# Patient Record
Sex: MALE | Race: WHITE | NOT HISPANIC OR LATINO | Employment: FULL TIME | URBAN - METROPOLITAN AREA
[De-identification: names, ages, dates, MRNs, and addresses within clinical notes are randomized per-mention and may not be internally consistent; named-entity substitution may affect disease eponyms.]

---

## 2023-10-27 ENCOUNTER — TELEPHONE (OUTPATIENT)
Dept: PRIMARY CARE | Facility: CLINIC | Age: 28
End: 2023-10-27
Payer: COMMERCIAL

## 2023-10-27 DIAGNOSIS — F90.9 ATTENTION DEFICIT HYPERACTIVITY DISORDER (ADHD), UNSPECIFIED ADHD TYPE: ICD-10-CM

## 2023-10-30 RX ORDER — LISDEXAMFETAMINE DIMESYLATE 30 MG/1
30 CAPSULE ORAL
COMMUNITY
End: 2023-10-30 | Stop reason: SDUPTHER

## 2023-10-30 RX ORDER — LISDEXAMFETAMINE DIMESYLATE 30 MG/1
30 CAPSULE ORAL
Qty: 30 CAPSULE | Refills: 0 | Status: SHIPPED | OUTPATIENT
Start: 2023-10-30 | End: 2023-11-01 | Stop reason: SDUPTHER

## 2023-10-31 DIAGNOSIS — F90.9 ATTENTION DEFICIT HYPERACTIVITY DISORDER (ADHD), UNSPECIFIED ADHD TYPE: ICD-10-CM

## 2023-10-31 NOTE — TELEPHONE ENCOUNTER
Jovan from [facility] pharmacy is calling in regards to last message about needing the generic vivans.

## 2023-10-31 NOTE — TELEPHONE ENCOUNTER
Pt says miguelangel was sent to pharmacy but he is needing the generic brand. His insurance is charging him  Contact numbers - [phone]

## 2023-11-01 RX ORDER — LISDEXAMFETAMINE DIMESYLATE 30 MG/1
30 CAPSULE ORAL
Qty: 30 CAPSULE | Refills: 0 | Status: SHIPPED | OUTPATIENT
Start: 2023-11-01 | End: 2023-11-29 | Stop reason: SDUPTHER

## 2023-11-28 ENCOUNTER — TELEPHONE (OUTPATIENT)
Dept: PRIMARY CARE | Facility: CLINIC | Age: 28
End: 2023-11-28
Payer: COMMERCIAL

## 2023-11-28 DIAGNOSIS — F90.9 ATTENTION DEFICIT HYPERACTIVITY DISORDER (ADHD), UNSPECIFIED ADHD TYPE: ICD-10-CM

## 2023-11-29 RX ORDER — LISDEXAMFETAMINE DIMESYLATE 30 MG/1
30 CAPSULE ORAL
Qty: 30 CAPSULE | Refills: 0 | Status: SHIPPED | OUTPATIENT
Start: 2023-11-29 | End: 2023-12-27 | Stop reason: SDUPTHER

## 2023-12-27 ENCOUNTER — TELEPHONE (OUTPATIENT)
Dept: PRIMARY CARE | Facility: CLINIC | Age: 28
End: 2023-12-27
Payer: COMMERCIAL

## 2023-12-27 DIAGNOSIS — F90.9 ATTENTION DEFICIT HYPERACTIVITY DISORDER (ADHD), UNSPECIFIED ADHD TYPE: ICD-10-CM

## 2023-12-27 RX ORDER — LISDEXAMFETAMINE DIMESYLATE 30 MG/1
30 CAPSULE ORAL
Qty: 30 CAPSULE | Refills: 0 | Status: SHIPPED | OUTPATIENT
Start: 2023-12-27 | End: 2024-02-02

## 2024-01-29 ENCOUNTER — TELEPHONE (OUTPATIENT)
Dept: PRIMARY CARE | Facility: CLINIC | Age: 29
End: 2024-01-29
Payer: COMMERCIAL

## 2024-01-29 NOTE — LETTER
February 1, 2024     Jad Buchanan  [address] [place] [address]    Patient:    Date of Birth:    Date of Visit:        Dear  Jad Buchanan:      We have been trying to reach you to schedule an appointment, please call us at [phone]  Sincerely,     Eva Brandon, APRN-CNP      CC: No Recipients  ______________________________________________________________________________________    Refill on Vyvanse    [facility]

## 2024-02-02 ENCOUNTER — TELEPHONE (OUTPATIENT)
Dept: PRIMARY CARE | Facility: CLINIC | Age: 29
End: 2024-02-02
Payer: COMMERCIAL

## 2024-02-05 ENCOUNTER — OFFICE VISIT (OUTPATIENT)
Dept: PRIMARY CARE | Facility: CLINIC | Age: 29
End: 2024-02-05
Payer: COMMERCIAL

## 2024-02-05 VITALS
BODY MASS INDEX: 26.22 KG/M2 | SYSTOLIC BLOOD PRESSURE: 120 MMHG | OXYGEN SATURATION: 99 % | DIASTOLIC BLOOD PRESSURE: 60 MMHG | HEART RATE: 88 BPM | WEIGHT: 173 LBS | HEIGHT: 68 IN

## 2024-02-05 DIAGNOSIS — Z00.00 WELL ADULT EXAM: Primary | ICD-10-CM

## 2024-02-05 DIAGNOSIS — Q76.6 ABNORMAL PROMINENCE OF RIB: ICD-10-CM

## 2024-02-05 DIAGNOSIS — F90.9 ATTENTION DEFICIT HYPERACTIVITY DISORDER (ADHD), UNSPECIFIED ADHD TYPE: ICD-10-CM

## 2024-02-05 PROBLEM — F90.0 ATTENTION DEFICIT HYPERACTIVITY DISORDER, PREDOMINANTLY INATTENTIVE TYPE: Status: ACTIVE | Noted: 2024-02-05

## 2024-02-05 PROCEDURE — 1036F TOBACCO NON-USER: CPT | Performed by: NURSE PRACTITIONER

## 2024-02-05 PROCEDURE — 99395 PREV VISIT EST AGE 18-39: CPT | Performed by: NURSE PRACTITIONER

## 2024-02-05 PROCEDURE — 80324 DRUG SCREEN AMPHETAMINES 1/2: CPT

## 2024-02-05 ASSESSMENT — PATIENT HEALTH QUESTIONNAIRE - PHQ9
1. LITTLE INTEREST OR PLEASURE IN DOING THINGS: NOT AT ALL
SUM OF ALL RESPONSES TO PHQ9 QUESTIONS 1 AND 2: 0
2. FEELING DOWN, DEPRESSED OR HOPELESS: NOT AT ALL

## 2024-02-05 ASSESSMENT — PAIN SCALES - GENERAL: PAINLEVEL: 0-NO PAIN

## 2024-02-05 NOTE — PROGRESS NOTES
Subjective   Patient ID: Jad Buchanan is a 28 y.o. male who presents for CPE.    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 Has been on Vyvanse for some time. Current dose was started in high school and he has been doing well  patient reports he is doing well on medication. No problems focusing or completing tasks / work.   Diet is good, sleep is adequate  No complaints  patient does take medication breaks periodically  recently started working night shift and is trying to figure out appropriate dosing schedule  OARRS appropriate    immunizations reviewed    OARRS:  Eva Brandon, LAISHA-CNP on 2/5/2024  4:00 PM  Yes, I feel it is clincially indicated to continue the medication and have discussed with the patient risks/benefits/alternatives.    Is the patient prescribed a combination of a benzodiazepine and opioid?  No    Last Urine Drug Screen / ordered today: Yes  No results found for this or any previous visit (from the past 8760 hour(s)).  N/A      Controlled Substance Agreement:  Date of the Last Agreement: today  Reviewed Controlled Substance Agreement including but not limited to the benefits, risks, and alternatives to treatment with a Controlled Substance medication(s).    Stimulants:   What is the patient's goal of therapy? To be able focus without distraction  Is this being achieved with current treatment? yes    Activities of Daily Living:   Is your overall impression that this patient is benefiting (symptom reduction outweighs side effects) from stimulant therapy? Yes     1. Physical Functioning: Better  2. Family Relationship: Better  3. Social Relationship: Better  4. Mood: Same  5. Sleep Patterns: Same  6. Overall Function: Better    Review of Systems  Constitutional Symptoms: negative  "for fever, loss of appetite, headaches, fatigue.   Eyes: negative for loss and blurring of vision, double vision.   Ear, Nose, Mouth, Throat: negative for hearing loss, tinnitus, nasal congestion, rhinorrhea, nose bleeds, teeth problems, mouth sores, gum disease, dysphagia, sore throat.   Cardiovascular: negative for chest pain/pressure, palpitations, edema, claudication.   Respiratory: negative for shortness of breath, dyspnea on exertion, pain with breathing, coughing. Positive for right posterior rib abnormality that he has felt for some time  Breast: negative for tenderness, masses, gynecomastia.   Gastrointestinal: negative for anorexia, indigestion, nausea, vomiting, abdominal pain, change in bowel habits, diarrhea, constipation, hematochezia, melena, blood in stool.    : Negative for urinary or penile complaints  Musculoskeletal: negative for joint pain, joint swelling, myalgia, cramps.   Integumentary: negative for change in mole, skin trouble or rash.   Neurological: negative for headache, numbness, tingling, weakness, tremors.   Psychiatric: negative for depression, anxiety.   Endocrine: negative for weight gain, heat or cold intolerance, polyuria, polydipsia, polyphagia.   Hematologic/Lymphatic: negative for bruising, abnormal bleeding, swollen glands.    Objective   /60   Pulse 88   Ht 1.727 m (5' 8\")   Wt 78.5 kg (173 lb)   SpO2 99%   BMI 26.30 kg/m²     Physical Exam  General Appearance: Vital Signs have been reviewed. Comfortable. Well nourished, and well developed. He is awake, alert, and oriented and appears his stated age. The patient is cooperative with exam.  Head: Hair pattern reveals a normal pattern for patient's age and The face shows no abnormalities.  Eyes: PERRLA, EOMI, conjunctiva and sclera clear. Extraocular muscle exam reveals EOMI.  Ears, Nose, Mouth, and Throat: EARS: External bilateral ears reveals normal helix, tragus and ear lobe. Bilateral canals are normal. Both " tympanic membranes are pearly gray and landmarks normal.   NOSE: Nasal mucosa in both nostrils reveals no polyps, ulcerations, or lesions. Teeth reveal good repair. Posterior pharynx reveals no abnormalities.  Neck: Neck reveals supple, no adenopathy, no thyromegaly, or carotid bruits.  Chest: Lungs are clear to auscultation bilaterally with no wheezes, rales, or rhonchi. Chest symmetrical. No deformity, crepitus, tenderness  Cardiovascular: RRR without MRG. Bilateral DP pulses are 2+. Extremities reveal no cyanosis, clubbing, or edema.  Abdomen: Abdomen is soft, NT, ND with no masses.   Genitourinary: deferred, no complaints.  Musculoskeletal: 5/5 and equal strength in bilateral upper and lower extremities.  Skin: Skin reveals good turgor and no rashes.  Neurological: Neuro: Intact and non-focal.  Psychiatric: Patient has appropriate judgement. Patient has good insight. Patient's mood is appropriate    Assessment/Plan   Diagnoses and all orders for this visit:  Well adult exam  Continue current medications  healthy diet  DASH diet  exercise  safety  Attention deficit hyperactivity disorder (ADHD), unspecified ADHD type  -     Amphetamine Confirm, Urine; Future  Oarrs reviewed  Drug screen ordered  Consent obtained  Appropriate to continue meds at this time  Follow up 6 months  Abnormal prominence of rib  -     XR ribs right 2 views; Future  Xray ordered for evaluation of abnormality that pt has felt on right posterior ribs  Await results

## 2024-02-09 LAB
AMPHET UR-MCNC: 2230 NG/ML
MDA UR-MCNC: <200 NG/ML
MDEA UR-MCNC: <200 NG/ML
MDMA UR-MCNC: <200 NG/ML
METHAMPHET UR-MCNC: <200 NG/ML
PHENTERMINE UR CFM-MCNC: <200 NG/ML

## 2024-03-04 ENCOUNTER — TELEPHONE (OUTPATIENT)
Dept: PRIMARY CARE | Facility: CLINIC | Age: 29
End: 2024-03-04
Payer: COMMERCIAL

## 2024-03-04 DIAGNOSIS — F90.9 ATTENTION DEFICIT HYPERACTIVITY DISORDER (ADHD), UNSPECIFIED ADHD TYPE: ICD-10-CM

## 2024-03-04 RX ORDER — LISDEXAMFETAMINE DIMESYLATE 30 MG/1
30 CAPSULE ORAL
Qty: 30 CAPSULE | Refills: 0 | Status: SHIPPED | OUTPATIENT
Start: 2024-03-04 | End: 2024-04-04 | Stop reason: SDUPTHER

## 2024-04-04 ENCOUNTER — TELEPHONE (OUTPATIENT)
Dept: PRIMARY CARE | Facility: CLINIC | Age: 29
End: 2024-04-04
Payer: COMMERCIAL

## 2024-04-04 DIAGNOSIS — F90.9 ATTENTION DEFICIT HYPERACTIVITY DISORDER (ADHD), UNSPECIFIED ADHD TYPE: ICD-10-CM

## 2024-04-04 RX ORDER — LISDEXAMFETAMINE DIMESYLATE 30 MG/1
30 CAPSULE ORAL
Qty: 30 CAPSULE | Refills: 0 | Status: SHIPPED | OUTPATIENT
Start: 2024-04-04 | End: 2024-05-07 | Stop reason: SDUPTHER

## 2024-04-04 NOTE — TELEPHONE ENCOUNTER
Patient called the Rx line and requested a refill for Vyvanse 30 mg.   Ph[facility]acy: [facility] [address]  Patient phone #: [phone]  Last appt: 2/5/2024

## 2024-05-07 ENCOUNTER — TELEPHONE (OUTPATIENT)
Dept: PRIMARY CARE | Facility: CLINIC | Age: 29
End: 2024-05-07
Payer: COMMERCIAL

## 2024-05-07 DIAGNOSIS — F90.9 ATTENTION DEFICIT HYPERACTIVITY DISORDER (ADHD), UNSPECIFIED ADHD TYPE: ICD-10-CM

## 2024-05-07 RX ORDER — LISDEXAMFETAMINE DIMESYLATE 30 MG/1
30 CAPSULE ORAL
Qty: 30 CAPSULE | Refills: 0 | Status: SHIPPED | OUTPATIENT
Start: 2024-05-07 | End: 2024-06-10 | Stop reason: SDUPTHER

## 2024-06-10 ENCOUNTER — TELEPHONE (OUTPATIENT)
Dept: PRIMARY CARE | Facility: CLINIC | Age: 29
End: 2024-06-10
Payer: COMMERCIAL

## 2024-06-10 DIAGNOSIS — F90.9 ATTENTION DEFICIT HYPERACTIVITY DISORDER (ADHD), UNSPECIFIED ADHD TYPE: ICD-10-CM

## 2024-06-10 RX ORDER — LISDEXAMFETAMINE DIMESYLATE 30 MG/1
30 CAPSULE ORAL
Qty: 30 CAPSULE | Refills: 0 | Status: SHIPPED | OUTPATIENT
Start: 2024-06-10

## 2024-06-10 NOTE — TELEPHONE ENCOUNTER
Patient called for a refill on Vyvanse 30 mg.  Ph[facility]acy is [facility] [address] [place] [facility].    Patient's number:  [phone]

## 2024-07-11 ENCOUNTER — TELEPHONE (OUTPATIENT)
Dept: PRIMARY CARE | Facility: CLINIC | Age: 29
End: 2024-07-11
Payer: COMMERCIAL

## 2024-07-11 DIAGNOSIS — F90.9 ATTENTION DEFICIT HYPERACTIVITY DISORDER (ADHD), UNSPECIFIED ADHD TYPE: ICD-10-CM

## 2024-07-11 RX ORDER — LISDEXAMFETAMINE DIMESYLATE 30 MG/1
30 CAPSULE ORAL
Qty: 30 CAPSULE | Refills: 0 | Status: SHIPPED | OUTPATIENT
Start: 2024-07-11

## 2024-07-11 NOTE — TELEPHONE ENCOUNTER
Patient needs a refill on Vyvanse 30 mg.  [facility] [address] [place]    Patient's number:  [phone]

## 2024-08-12 DIAGNOSIS — F90.9 ATTENTION DEFICIT HYPERACTIVITY DISORDER (ADHD), UNSPECIFIED ADHD TYPE: ICD-10-CM

## 2024-08-12 RX ORDER — LISDEXAMFETAMINE DIMESYLATE 30 MG/1
30 CAPSULE ORAL
Qty: 30 CAPSULE | Refills: 0 | Status: SHIPPED | OUTPATIENT
Start: 2024-08-12 | End: 2024-09-10 | Stop reason: SDUPTHER

## 2024-08-12 NOTE — TELEPHONE ENCOUNTER
Patient is overdue for his 6-month controlled substance office visit. I will send in a 30-day supply to allow time for him to schedule an office visit.  This will be the only prescription that will be sent without an updated office visit due to controlled substance guidelines.

## 2024-08-12 NOTE — TELEPHONE ENCOUNTER
Patient wants a refill on Vyvanse 30 mg.  [facility] is [facility] [address] [place]    Patient's number:  [phone]

## 2024-08-14 ENCOUNTER — OFFICE VISIT (OUTPATIENT)
Dept: PRIMARY CARE | Facility: CLINIC | Age: 29
End: 2024-08-14
Payer: COMMERCIAL

## 2024-08-14 VITALS
HEART RATE: 98 BPM | DIASTOLIC BLOOD PRESSURE: 60 MMHG | OXYGEN SATURATION: 99 % | TEMPERATURE: 98.3 F | BODY MASS INDEX: 26.67 KG/M2 | WEIGHT: 176 LBS | SYSTOLIC BLOOD PRESSURE: 120 MMHG | HEIGHT: 68 IN

## 2024-08-14 DIAGNOSIS — F90.9 ATTENTION DEFICIT HYPERACTIVITY DISORDER (ADHD), UNSPECIFIED ADHD TYPE: Primary | ICD-10-CM

## 2024-08-14 PROCEDURE — 3008F BODY MASS INDEX DOCD: CPT | Performed by: NURSE PRACTITIONER

## 2024-08-14 PROCEDURE — 99213 OFFICE O/P EST LOW 20 MIN: CPT | Performed by: NURSE PRACTITIONER

## 2024-08-14 ASSESSMENT — PAIN SCALES - GENERAL: PAINLEVEL: 0-NO PAIN

## 2024-08-14 ASSESSMENT — PATIENT HEALTH QUESTIONNAIRE - PHQ9
2. FEELING DOWN, DEPRESSED OR HOPELESS: NOT AT ALL
1. LITTLE INTEREST OR PLEASURE IN DOING THINGS: NOT AT ALL
SUM OF ALL RESPONSES TO PHQ9 QUESTIONS 1 AND 2: 0

## 2024-08-14 NOTE — PROGRESS NOTES
Subjective   Patient ID: Fernando Buchanan is a 28 y.o. male who presents for ADHD.    HPI    History of ADHD which was diagnosed as a child. Has been on Vyvanse for some time. Current dose was started in high school and he has been doing well  patient reports he is doing well on medication. No problems focusing or completing tasks / work.   Diet is good, sleep is adequate  No complaints  patient does take medication breaks periodically  recently started working night shift and is trying to figure out appropriate dosing schedule  OARRS appropriate     OARRS:  Eva Brandon, APRN-CNP on 8/14/2024  3:35 PM  I have personally reviewed the OARRS report for Jad Buchanan. I have considered the risks of abuse, dependence, addiction and diversion    Is the patient prescribed a combination of a benzodiazepine and opioid?  Yes, I feel it is clincially indicated to continue the medication and have discussed with the patient risks/benefits/alternatives.    Last Urine Drug Screen / ordered today: No  Recent Results (from the past 8760 hour(s))   Amphetamine Confirm, Urine    Collection Time: 02/05/24  4:30 PM   Result Value Ref Range    Methamphetamine Quant, Ur <200 ng/mL    MDA, Urine <200 ng/mL    MDEA, Urine <200 ng/mL    Phentermine,Urine <200 ng/mL    Amphetamines,Urine 2230 ng/mL    MDMA, Urine <200 ng/mL     Results are as expected.         Controlled Substance Agreement:  Date of the Last Agreement: 2/5/24  Reviewed Controlled Substance Agreement including but not limited to the benefits, risks, and alternatives to treatment with a Controlled Substance medication(s).    Stimulants:   What is the patient's goal of therapy? To focus and get tasks done  Is this being achieved with current treatment? yes    Activities of Daily Living:   Is your overall impression that this patient is benefiting (symptom reduction outweighs side effects) from stimulant therapy? Yes     1. Physical Functioning: Better  2. Family  "Relationship: Better  3. Social Relationship: Better  4. Mood: Same  5. Sleep Patterns: Same  6. Overall Function: Better    Review of Systems  Constitutional Symptoms: Negative for fever, loss of appetite, headaches, fatigue.   Cardiovascular: Negative for chest pain/pressure, palpitations, edema  Respiratory: Negative for shortness of breath, dyspnea on exertion, pain with breathing, coughing.   Gastrointestinal: Negative for nausea, vomiting, abdominal pain, change in bowel habits  Musculoskeletal: Negative for joint pain, joint swelling, myalgias, cramps.   Integumentary: Negative for skin trouble or rash.   Neurological: Negative for headache, numbness, tingling, weakness, tremors.   Psychiatric: Negative for depression, anxiety.   Endocrine: Negative for weight gain, heat or cold intolerance, polyuria, polydipsia, polyphagia.   Hematologic/Lymphatic: Negative for bruising, abnormal bleeding, swollen glands.     Objective   /60 (BP Location: Left arm, Patient Position: Sitting)   Pulse 98   Temp 36.8 °C (98.3 °F) (Temporal)   Ht 1.727 m (5' 8\")   Wt 79.8 kg (176 lb)   SpO2 99%   BMI 26.76 kg/m²     Physical Exam  alert and oriented x3, NAD  Neck supple with no JVD  Lungs CTA bilaterally  Heart with RRR with no edema.  skin warm and dry  Neuro grossly intact     Assessment/Plan   Diagnoses and all orders for this visit:  Attention deficit hyperactivity disorder (ADHD), unspecified ADHD type  I have personally reviewed the OARRS report.  I have considered the risks of abuse, dependence, addiction and diversion.  I believe that it is clinically appropriate for patient to be prescribed this medication.   Controlled substance guidelines reviewed and updated  Appropriate to continue meds at this time  Follow up 6 months for cpe     "

## 2024-09-10 ENCOUNTER — TELEPHONE (OUTPATIENT)
Dept: PRIMARY CARE | Facility: CLINIC | Age: 29
End: 2024-09-10
Payer: COMMERCIAL

## 2024-09-10 DIAGNOSIS — F90.9 ATTENTION DEFICIT HYPERACTIVITY DISORDER (ADHD), UNSPECIFIED ADHD TYPE: ICD-10-CM

## 2024-09-10 RX ORDER — LISDEXAMFETAMINE DIMESYLATE 30 MG/1
30 CAPSULE ORAL
Qty: 30 CAPSULE | Refills: 0 | Status: SHIPPED | OUTPATIENT
Start: 2024-09-10

## 2024-09-10 NOTE — TELEPHONE ENCOUNTER
Patient wants a refill on Vyvanse 30 mg.  [facility] is [facility] [address] [place]    Patient's number:  [phone]

## 2024-10-14 ENCOUNTER — TELEPHONE (OUTPATIENT)
Dept: PRIMARY CARE | Facility: CLINIC | Age: 29
End: 2024-10-14
Payer: COMMERCIAL

## 2024-10-14 DIAGNOSIS — F90.9 ATTENTION DEFICIT HYPERACTIVITY DISORDER (ADHD), UNSPECIFIED ADHD TYPE: ICD-10-CM

## 2024-10-14 RX ORDER — LISDEXAMFETAMINE DIMESYLATE 30 MG/1
30 CAPSULE ORAL
Qty: 30 CAPSULE | Refills: 0 | Status: SHIPPED | OUTPATIENT
Start: 2024-10-14

## 2024-10-14 NOTE — TELEPHONE ENCOUNTER
Patient wants a refill of Vyvanse 30 mg.  [facility] [place]    Patient's number:  [phone]

## 2024-11-13 ENCOUNTER — TELEPHONE (OUTPATIENT)
Dept: PRIMARY CARE | Facility: CLINIC | Age: 29
End: 2024-11-13
Payer: COMMERCIAL

## 2024-11-13 DIAGNOSIS — F90.9 ATTENTION DEFICIT HYPERACTIVITY DISORDER (ADHD), UNSPECIFIED ADHD TYPE: ICD-10-CM

## 2024-11-13 RX ORDER — LISDEXAMFETAMINE DIMESYLATE 30 MG/1
30 CAPSULE ORAL
Qty: 30 CAPSULE | Refills: 0 | Status: SHIPPED | OUTPATIENT
Start: 2024-11-13

## 2024-11-13 NOTE — TELEPHONE ENCOUNTER
Patient called on RX line to request a refill of Vyvanse  30 [place] [facility] [place]  His last visit was 8/14/2024 for medication review.  Please advise.     Patient ph# [phone]

## 2024-12-19 ENCOUNTER — TELEPHONE (OUTPATIENT)
Dept: PRIMARY CARE | Facility: CLINIC | Age: 29
End: 2024-12-19
Payer: COMMERCIAL

## 2024-12-19 DIAGNOSIS — F90.9 ATTENTION DEFICIT HYPERACTIVITY DISORDER (ADHD), UNSPECIFIED ADHD TYPE: ICD-10-CM

## 2024-12-19 RX ORDER — LISDEXAMFETAMINE DIMESYLATE 30 MG/1
30 CAPSULE ORAL
Qty: 30 CAPSULE | Refills: 0 | Status: SHIPPED | OUTPATIENT
Start: 2024-12-19

## 2024-12-19 NOTE — TELEPHONE ENCOUNTER
Savanna Brandon patient would like a refill of Vyvanse 30 mg.  [facility] is [facility] [address] [place]

## 2025-01-21 ENCOUNTER — TELEPHONE (OUTPATIENT)
Dept: PRIMARY CARE | Facility: CLINIC | Age: 30
End: 2025-01-21
Payer: COMMERCIAL

## 2025-01-21 DIAGNOSIS — F90.9 ATTENTION DEFICIT HYPERACTIVITY DISORDER (ADHD), UNSPECIFIED ADHD TYPE: ICD-10-CM

## 2025-01-21 NOTE — TELEPHONE ENCOUNTER
Refill needed for Former [facility] Patient    LISDEXAMFETAMINE 30 MG    Ph[facility]acy is [facility] [address]    Patient can be reached at [phone]

## 2025-01-21 NOTE — TELEPHONE ENCOUNTER
If he is in danger of running out of this medication, please contact patient to move up his appointment.  I need to see him and have an established relationship prior to filling this type of medication.

## 2025-01-22 RX ORDER — LISDEXAMFETAMINE DIMESYLATE 30 MG/1
30 CAPSULE ORAL
Qty: 30 CAPSULE | Refills: 0 | OUTPATIENT
Start: 2025-01-22

## 2025-01-22 NOTE — TELEPHONE ENCOUNTER
Patient's last CPE was 02-05-24 since he's due for his next CPE after that date, appointment for CPE stayed the same. Called Patient and Offered Patient a medication review for sooner he stated he would be out of town until the weekend. Patient was told what LOUIE advised regarding filling this type of medication. Patient stated he only has 2 pills left and asked if A could send in enough medication to cover until his appointment on 01-28-25.      Please advise     Patient can be reached at [phone]

## 2025-01-22 NOTE — TELEPHONE ENCOUNTER
I have not yet seen this patient to establish care with me.  I will not fill controlled meds without an established physician-patient relationship.

## 2025-01-22 NOTE — TELEPHONE ENCOUNTER
Could do BMP and lipid panel under lipid screening.  I'm hoping clerical folks can get him set up with a sooner appointment so don't worry about pending orders until we have a date.

## 2025-01-22 NOTE — TELEPHONE ENCOUNTER
Patient's last CPE was 02-05-24 since he's due for his next CPE after that date, appointment for CPE stayed the same. Called Patient and Offered Patient a medication review for sooner he stated he would be out of town until the weekend. Patient was told what LOUIE advised regarding filling this type of medication. Patient stated he only has 2 pills left and asked if A could send in enough medication to cover until his appointment on 01-28-25.     Please advise    Patient can be reached at [phone]

## 2025-01-22 NOTE — TELEPHONE ENCOUNTER
Rules for this medication are quite strict.  I cannot fill it for him until I have an established physician-patient relationship with him.

## 2025-01-28 ENCOUNTER — TELEPHONE (OUTPATIENT)
Dept: PRIMARY CARE | Facility: CLINIC | Age: 30
End: 2025-01-28

## 2025-01-28 ENCOUNTER — APPOINTMENT (OUTPATIENT)
Dept: PRIMARY CARE | Facility: CLINIC | Age: 30
End: 2025-01-28
Payer: COMMERCIAL

## 2025-01-28 VITALS
WEIGHT: 171 LBS | SYSTOLIC BLOOD PRESSURE: 110 MMHG | OXYGEN SATURATION: 99 % | BODY MASS INDEX: 26 KG/M2 | HEART RATE: 90 BPM | DIASTOLIC BLOOD PRESSURE: 70 MMHG

## 2025-01-28 DIAGNOSIS — F90.0 ATTENTION DEFICIT HYPERACTIVITY DISORDER, PREDOMINANTLY INATTENTIVE TYPE: Primary | ICD-10-CM

## 2025-01-28 DIAGNOSIS — Z13.220 LIPID SCREENING: ICD-10-CM

## 2025-01-28 PROCEDURE — 99213 OFFICE O/P EST LOW 20 MIN: CPT | Performed by: STUDENT IN AN ORGANIZED HEALTH CARE EDUCATION/TRAINING PROGRAM

## 2025-01-28 PROCEDURE — 1036F TOBACCO NON-USER: CPT | Performed by: STUDENT IN AN ORGANIZED HEALTH CARE EDUCATION/TRAINING PROGRAM

## 2025-01-28 RX ORDER — LISDEXAMFETAMINE DIMESYLATE 30 MG/1
30 CAPSULE ORAL EVERY MORNING
Qty: 30 CAPSULE | Refills: 0 | Status: SHIPPED | OUTPATIENT
Start: 2025-01-28 | End: 2025-02-27

## 2025-01-28 RX ORDER — LISDEXAMFETAMINE DIMESYLATE 30 MG/1
30 CAPSULE ORAL EVERY MORNING
Qty: 30 CAPSULE | Refills: 0 | Status: SHIPPED | OUTPATIENT
Start: 2025-04-28 | End: 2025-05-28

## 2025-01-28 RX ORDER — LISDEXAMFETAMINE DIMESYLATE 30 MG/1
30 CAPSULE ORAL EVERY MORNING
Qty: 30 CAPSULE | Refills: 0 | Status: SHIPPED | OUTPATIENT
Start: 2025-05-28 | End: 2025-06-27

## 2025-01-28 RX ORDER — LISDEXAMFETAMINE DIMESYLATE 30 MG/1
30 CAPSULE ORAL EVERY MORNING
Qty: 30 CAPSULE | Refills: 0 | Status: SHIPPED | OUTPATIENT
Start: 2025-06-27 | End: 2025-07-27

## 2025-01-28 RX ORDER — LISDEXAMFETAMINE DIMESYLATE 30 MG/1
30 CAPSULE ORAL EVERY MORNING
Qty: 30 CAPSULE | Refills: 0 | Status: SHIPPED | OUTPATIENT
Start: 2025-02-27 | End: 2025-03-29

## 2025-01-28 RX ORDER — LISDEXAMFETAMINE DIMESYLATE 30 MG/1
30 CAPSULE ORAL EVERY MORNING
Qty: 30 CAPSULE | Refills: 0 | Status: SHIPPED | OUTPATIENT
Start: 2025-03-29 | End: 2025-04-28

## 2025-01-28 ASSESSMENT — ENCOUNTER SYMPTOMS
COUGH: 0
NAUSEA: 0
VOMITING: 0
FREQUENCY: 0
WHEEZING: 0
PALPITATIONS: 0
CONSTIPATION: 0
DYSPHORIC MOOD: 0
FATIGUE: 0
LIGHT-HEADEDNESS: 0
MYALGIAS: 0
RHINORRHEA: 0
SINUS PRESSURE: 0
SINUS PAIN: 0
FEVER: 0
SLEEP DISTURBANCE: 0
HEADACHES: 0
ARTHRALGIAS: 0
DYSURIA: 0
DIARRHEA: 0
WOUND: 0
SHORTNESS OF BREATH: 0
CHILLS: 0
DIZZINESS: 0
NERVOUS/ANXIOUS: 0
POLYDIPSIA: 0

## 2025-01-28 ASSESSMENT — PAIN SCALES - GENERAL: PAINLEVEL_OUTOF10: 0-NO PAIN

## 2025-01-28 NOTE — ASSESSMENT & PLAN NOTE
Orders:    Drug Screen, Urine With Reflex to Confirmation; Future    Amphetamine Confirm, Urine; Future    lisdexamfetamine (Vyvanse) 30 mg capsule; Take 1 capsule (30 mg) by mouth once daily in the morning.    lisdexamfetamine (Vyvanse) 30 mg capsule; Take 1 capsule (30 mg) by mouth once daily in the morning. Do not fill before February 27, 2025.    lisdexamfetamine (Vyvanse) 30 mg capsule; Take 1 capsule (30 mg) by mouth once daily in the morning. Do not fill before March 29, 2025.    lisdexamfetamine (Vyvanse) 30 mg capsule; Take 1 capsule (30 mg) by mouth once daily in the morning. Do not fill before April 28, 2025.    lisdexamfetamine (Vyvanse) 30 mg capsule; Take 1 capsule (30 mg) by mouth once daily in the morning. Do not fill before May 28, 2025.    lisdexamfetamine (Vyvanse) 30 mg capsule; Take 1 capsule (30 mg) by mouth once daily in the morning. Do not fill before June 27, 2025.  UDS obtained today and new CSA signed.    I have personally reviewed the OARRS report. I have considered the risks of abuse, dependence, addiction and diversion. I believe that it is clinically appropriate for patient to be prescribed this medication.

## 2025-01-28 NOTE — PROGRESS NOTES
"Subjective   Patient ID: Jad Buchanan \"Fernando\" is a 29 y.o. male who presents for Med Management.    Here today to follow up regarding ADHD.  Taking Vyvanse 30 mg.  Had briefly been on adderall when vyvanse was not available.  States control of symptoms is better with Vyvanse.  Has not been requiring higher doses over time.  Taking medication as prescribed.          Review of Systems   Constitutional:  Negative for chills, fatigue and fever.   HENT:  Negative for congestion, hearing loss, rhinorrhea, sinus pressure, sinus pain and tinnitus.    Eyes:  Negative for visual disturbance.   Respiratory:  Negative for cough, shortness of breath and wheezing.    Cardiovascular:  Negative for chest pain, palpitations and leg swelling.   Gastrointestinal:  Negative for constipation, diarrhea, nausea and vomiting.   Endocrine: Negative for cold intolerance, heat intolerance, polydipsia and polyuria.   Genitourinary:  Negative for dysuria, frequency and urgency.   Musculoskeletal:  Negative for arthralgias and myalgias.   Skin:  Negative for pallor, rash and wound.   Neurological:  Negative for dizziness, light-headedness and headaches.   Psychiatric/Behavioral:  Negative for dysphoric mood and sleep disturbance. The patient is not nervous/anxious.        Objective     Visit Vitals  /70 (BP Location: Left arm)   Pulse 90   Wt 77.6 kg (171 lb)   SpO2 99%   BMI 26.00 kg/m²   Smoking Status Never   BSA 1.93 m²         Physical Exam  Constitutional:       Appearance: Normal appearance.   HENT:      Head: Normocephalic and atraumatic.      Right Ear: Tympanic membrane, ear canal and external ear normal.      Left Ear: Tympanic membrane, ear canal and external ear normal.      Nose: Nose normal.      Mouth/Throat:      Mouth: Mucous membranes are moist.      Pharynx: Oropharynx is clear.   Eyes:      Extraocular Movements: Extraocular movements intact.      Conjunctiva/sclera: Conjunctivae normal.      Pupils: Pupils are " equal, round, and reactive to light.   Cardiovascular:      Rate and Rhythm: Normal rate and regular rhythm.      Pulses: Normal pulses.      Heart sounds: Normal heart sounds.   Pulmonary:      Effort: Pulmonary effort is normal.      Breath sounds: Normal breath sounds.   Abdominal:      General: There is no distension.      Palpations: Abdomen is soft.      Tenderness: There is no abdominal tenderness.   Musculoskeletal:         General: Normal range of motion.   Skin:     General: Skin is warm and dry.   Neurological:      Mental Status: He is alert and oriented to person, place, and time. Mental status is at baseline.   Psychiatric:         Mood and Affect: Mood normal.         Behavior: Behavior normal.         Assessment & Plan  Attention deficit hyperactivity disorder, predominantly inattentive type    Orders:    Drug Screen, Urine With Reflex to Confirmation; Future    Amphetamine Confirm, Urine; Future    lisdexamfetamine (Vyvanse) 30 mg capsule; Take 1 capsule (30 mg) by mouth once daily in the morning.    lisdexamfetamine (Vyvanse) 30 mg capsule; Take 1 capsule (30 mg) by mouth once daily in the morning. Do not fill before February 27, 2025.    lisdexamfetamine (Vyvanse) 30 mg capsule; Take 1 capsule (30 mg) by mouth once daily in the morning. Do not fill before March 29, 2025.    lisdexamfetamine (Vyvanse) 30 mg capsule; Take 1 capsule (30 mg) by mouth once daily in the morning. Do not fill before April 28, 2025.    lisdexamfetamine (Vyvanse) 30 mg capsule; Take 1 capsule (30 mg) by mouth once daily in the morning. Do not fill before May 28, 2025.    lisdexamfetamine (Vyvanse) 30 mg capsule; Take 1 capsule (30 mg) by mouth once daily in the morning. Do not fill before June 27, 2025.  UDS obtained today and new CSA signed.    I have personally reviewed the OARRS report. I have considered the risks of abuse, dependence, addiction and diversion. I believe that it is clinically appropriate for patient to be  prescribed this medication.      RTC in 6 months to review ADHD and for annual CPE.   Reviewed and approved by HIGINIO PEDERSEN on 1/28/25 at 4:13 PM.

## 2025-01-29 LAB
AMPHET UR-MCNC: NORMAL NG/ML
AMPHETAMINES UR QL: NEGATIVE NG/ML
BARBITURATES UR QL: NEGATIVE NG/ML
BENZODIAZ UR QL: NEGATIVE NG/ML
BZE UR QL: NEGATIVE NG/ML
CREAT UR-MCNC: 180.7 MG/DL
MDA UR-MCNC: NORMAL UG/ML
MDEA UR-MCNC: NORMAL NG/ML
MDMA UR-MCNC: NORMAL NG/ML
METHADONE UR QL: NEGATIVE NG/ML
METHAMPHET UR-MCNC: NORMAL UG/ML
OPIATES UR QL: NEGATIVE NG/ML
OXIDANTS UR QL: NEGATIVE MCG/ML
OXYCODONE UR QL: NEGATIVE NG/ML
PCP UR QL: NEGATIVE NG/ML
PH UR: 7.3 [PH] (ref 4.5–9)
PHENTERMINE UR-MCNC: NORMAL UG/ML
QUEST NOTES AND COMMENTS: NORMAL
THC UR QL: NEGATIVE NG/ML

## 2025-01-29 NOTE — TELEPHONE ENCOUNTER
Notified patient by mail that labs need to be drawn prior to 7/28/2025 visit.  Could not leave message due to VM box full.

## 2025-02-05 LAB
AMPHET UR-MCNC: 414 NG/ML
AMPHETAMINES UR QL: NEGATIVE NG/ML
BARBITURATES UR QL: NEGATIVE NG/ML
BENZODIAZ UR QL: NEGATIVE NG/ML
BZE UR QL: NEGATIVE NG/ML
CREAT UR-MCNC: 180.7 MG/DL
MDA UR-MCNC: NEGATIVE NG/ML
MDEA UR-MCNC: NEGATIVE NG/ML
MDMA UR-MCNC: NEGATIVE NG/ML
METHADONE UR QL: NEGATIVE NG/ML
METHAMPHET UR-MCNC: NEGATIVE NG/ML
OPIATES UR QL: NEGATIVE NG/ML
OXIDANTS UR QL: NEGATIVE MCG/ML
OXYCODONE UR QL: NEGATIVE NG/ML
PCP UR QL: NEGATIVE NG/ML
PH UR: 7.3 [PH] (ref 4.5–9)
PHENTERMINE UR-MCNC: NEGATIVE NG/ML
QUEST NOTES AND COMMENTS: NORMAL
THC UR QL: NEGATIVE NG/ML

## 2025-02-07 ENCOUNTER — APPOINTMENT (OUTPATIENT)
Dept: PRIMARY CARE | Facility: CLINIC | Age: 30
End: 2025-02-07
Payer: COMMERCIAL

## 2025-06-06 ENCOUNTER — OFFICE VISIT (OUTPATIENT)
Dept: URGENT CARE | Age: 30
End: 2025-06-06
Payer: COMMERCIAL

## 2025-06-06 VITALS
SYSTOLIC BLOOD PRESSURE: 121 MMHG | TEMPERATURE: 98.5 F | DIASTOLIC BLOOD PRESSURE: 64 MMHG | OXYGEN SATURATION: 100 % | RESPIRATION RATE: 16 BRPM | HEART RATE: 64 BPM

## 2025-06-06 DIAGNOSIS — J06.9 UPPER RESPIRATORY TRACT INFECTION, UNSPECIFIED TYPE: Primary | ICD-10-CM

## 2025-06-06 PROCEDURE — 99070 SPECIAL SUPPLIES PHYS/QHP: CPT | Performed by: PHYSICIAN ASSISTANT

## 2025-06-06 PROCEDURE — 1036F TOBACCO NON-USER: CPT | Performed by: PHYSICIAN ASSISTANT

## 2025-06-06 PROCEDURE — 99203 OFFICE O/P NEW LOW 30 MIN: CPT | Performed by: PHYSICIAN ASSISTANT

## 2025-06-06 RX ORDER — DOXYCYCLINE 100 MG/1
100 CAPSULE ORAL 2 TIMES DAILY
Qty: 20 CAPSULE | Refills: 0 | Status: SHIPPED | OUTPATIENT
Start: 2025-06-06 | End: 2025-06-16

## 2025-06-06 NOTE — PATIENT INSTRUCTIONS
You were seen for cold like symptoms.  You most likely have a upper respiratory tract infection.  I recommend supportive therapy with the following medications below.    URI  1.  Please take antibiotic as prescribed  2.  Use Tea and honey for cough. This is known to work better than most OTC medications.  3.  Use Mucinex to break up congestion.   4.  Stay well-hydrated and drink lots of fluids.  5.  Follow up with your primary care physician in the next few days for reevaluation, especially if symptoms are not improving  6.  Return to the urgent care or emergency department if symptoms worsen or new symptoms develop.    Based on clinical exam findings and history you most likely have a upper respiratory tract infection.  Your initial illness was likely caused by a virus that progressed to a secondary bacterial infection.  You are contagious as soon as the symptoms start.  Your symptoms may persist up to 2-3 weeks.  Symptoms usually peak by days 3 or 5.  Typical symptoms include nasal congestion, a runny nose, scratchy throat, cough, and irritability. The diagnosis is based on symptoms. Good hand hygiene is the best way to prevent these infections, and routine vaccination can help prevent influenza. Treatment aims to relieve symptoms. Rest and fluids. Tylenol and/or ibuprofen for fever and pain. Over the counter decongestants or mucolytics.

## 2025-06-06 NOTE — PROGRESS NOTES
"Patient while [facility] outerSubjective   Patient ID: Jad Buchanan \"Shaggy" is a 29 y.o. male. They present today with a chief complaint of Cough (X 1 week).    HPI: Patient presenting for URI type symptoms x 1 week.  Symptoms include runny nose, cough and congestion.  No chest pain or shortness of breath.  Abdominal pain, nausea, vomiting.  No myalgias or chills.  No lower extremity swelling or pain.    Past Medical History  Allergies as of 06/06/2025    (No Known Allergies)     Prescriptions Prior to Admission[1]    Medical History[2]    Surgical History[3]     reports that he has never smoked. He has never used smokeless tobacco. He reports that he does not drink alcohol and does not use drugs.    Review of Systems  Review of Systems    After reviewing all body systems I have documented pertinent findings above in the history.  All other Systems reviewed and are negative for complaint.  Pertinent positive and negatives are listed in the above HPI.    Objective    Vitals:    06/06/25 1344   BP: 121/64   Pulse: 64   Resp: 16   Temp: 36.9 °C (98.5 °F)   SpO2: 100%     No LMP for male patient.    Physical Exam    General: Alert, oriented, and cooperative.  No acute distress. Well developed, well nourished.     Skin: Skin is warm, and dry. No rashes or lesions.    Eyes: Sclera and conjunctivae normal     Neck: Supple.     Cardiac: Regular rate     Respiratory:  No acute respiratory distress.  Regular rate of breathing.  No accessory muscle use.  No tripoding.      Abdomen: Nondistended.  Soft, nontender.  No CVA tenderness.    Musculoskeletal: Upper and lower extremities are atraumatic in appearance without deformity, erythema, edema, and atrophy. No crepitus, or tenderness. Compartments are all soft.      Procedures  Point of Care Test & Imaging Results from this visit    Imaging  No results found.    Cardiology, Vascular, and Other Imaging  No other imaging results found for the past 2 days    Diagnostic study " results (if any) were reviewed by Toñito Crawford PA-C.  Assessment/Plan   Allergies, medications, history, and pertinent labs/EKGs/Imaging reviewed by Toñito Crawford PA-C.     MDM:  Patient presenting for URI type symptoms x 7 days.  No relief despite taking over-the-counter medications.  Patient overall looks well.  Speaks in complete sentences.  No evidence of acute distress or respiratory compromise.  No tripoding. No nasal flaring. No clinical signs or history to suggest meningitis, Curt´s, peritonsillar abscess, epiglottitis, pneumonia, or asthma.  Due length and worsening of  symptoms a bacterial respiratory tract infection is considered the most likely cause.  Through shared decision making the patient was prescribed an antimicrobial as a treatment measure.  Advised supportive therapy with over the counter medication and follow-up with a PCP in the next few days. Pt/family instructed to return to the urgent care or emergency department if symptoms worsen or if new symptoms develop. Patient/family expressed understanding and consented to the above plan. No barriers of communication were apparent.        Orders and Diagnoses  Diagnoses and all orders for this visit:  Upper respiratory tract infection, unspecified type  -     doxycycline (Vibramycin) 100 mg capsule; Take 1 capsule (100 mg) by mouth 2 times a day for 10 days. Take with at least 8 ounces (large glass) of water, do not lie down for 30 minutes after    Medical Admin Record    Patient disposition: Home    Electronically signed by Toñito Crawford PA-C  1:46 PM         [1] (Not in a hospital admission)   [2]   Past Medical History:  Diagnosis Date    ADHD (attention deficit hyperactivity disorder)    [3] History reviewed. No pertinent surgical history.

## 2025-06-28 DIAGNOSIS — Z13.220 LIPID SCREENING: ICD-10-CM

## 2025-07-28 ENCOUNTER — APPOINTMENT (OUTPATIENT)
Dept: PRIMARY CARE | Facility: CLINIC | Age: 30
End: 2025-07-28
Payer: COMMERCIAL

## 2025-07-28 VITALS
SYSTOLIC BLOOD PRESSURE: 118 MMHG | HEART RATE: 80 BPM | OXYGEN SATURATION: 99 % | DIASTOLIC BLOOD PRESSURE: 56 MMHG | BODY MASS INDEX: 25.91 KG/M2 | HEIGHT: 68 IN | WEIGHT: 171 LBS

## 2025-07-28 DIAGNOSIS — F90.0 ATTENTION DEFICIT HYPERACTIVITY DISORDER, PREDOMINANTLY INATTENTIVE TYPE: Primary | ICD-10-CM

## 2025-07-28 DIAGNOSIS — Z00.00 ROUTINE GENERAL MEDICAL EXAMINATION AT A HEALTH CARE FACILITY: ICD-10-CM

## 2025-07-28 PROCEDURE — 99395 PREV VISIT EST AGE 18-39: CPT | Performed by: STUDENT IN AN ORGANIZED HEALTH CARE EDUCATION/TRAINING PROGRAM

## 2025-07-28 PROCEDURE — 3008F BODY MASS INDEX DOCD: CPT | Performed by: STUDENT IN AN ORGANIZED HEALTH CARE EDUCATION/TRAINING PROGRAM

## 2025-07-28 PROCEDURE — 99213 OFFICE O/P EST LOW 20 MIN: CPT | Performed by: STUDENT IN AN ORGANIZED HEALTH CARE EDUCATION/TRAINING PROGRAM

## 2025-07-28 PROCEDURE — 1036F TOBACCO NON-USER: CPT | Performed by: STUDENT IN AN ORGANIZED HEALTH CARE EDUCATION/TRAINING PROGRAM

## 2025-07-28 RX ORDER — LISDEXAMFETAMINE DIMESYLATE 30 MG/1
30 CAPSULE ORAL EVERY MORNING
Qty: 30 CAPSULE | Refills: 0 | Status: SHIPPED | OUTPATIENT
Start: 2025-08-08 | End: 2025-09-07

## 2025-07-28 RX ORDER — LISDEXAMFETAMINE DIMESYLATE 30 MG/1
30 CAPSULE ORAL EVERY MORNING
Qty: 30 CAPSULE | Refills: 0 | Status: SHIPPED | OUTPATIENT
Start: 2025-10-07 | End: 2025-11-06

## 2025-07-28 RX ORDER — LISDEXAMFETAMINE DIMESYLATE 30 MG/1
30 CAPSULE ORAL EVERY MORNING
Qty: 30 CAPSULE | Refills: 0 | Status: SHIPPED | OUTPATIENT
Start: 2025-09-07 | End: 2025-10-07

## 2025-07-28 ASSESSMENT — ENCOUNTER SYMPTOMS
SHORTNESS OF BREATH: 0
HEADACHES: 0
FATIGUE: 0
POLYDIPSIA: 0
FEVER: 0
VOMITING: 0
WOUND: 0
DIARRHEA: 0
COUGH: 0
DYSURIA: 0
SINUS PRESSURE: 0
SINUS PAIN: 0
ARTHRALGIAS: 0
SLEEP DISTURBANCE: 0
CONSTIPATION: 0
NERVOUS/ANXIOUS: 0
DYSPHORIC MOOD: 0
WHEEZING: 0
PALPITATIONS: 0
RHINORRHEA: 0
CHILLS: 0
DIZZINESS: 0
MYALGIAS: 0
LIGHT-HEADEDNESS: 0
FREQUENCY: 0
NAUSEA: 0

## 2025-07-28 ASSESSMENT — PATIENT HEALTH QUESTIONNAIRE - PHQ9
2. FEELING DOWN, DEPRESSED OR HOPELESS: NOT AT ALL
SUM OF ALL RESPONSES TO PHQ9 QUESTIONS 1 AND 2: 0
1. LITTLE INTEREST OR PLEASURE IN DOING THINGS: NOT AT ALL

## 2025-07-28 ASSESSMENT — PROMIS GLOBAL HEALTH SCALE
RATE_MENTAL_HEALTH: VERY GOOD
CARRYOUT_SOCIAL_ACTIVITIES: EXCELLENT
RATE_AVERAGE_PAIN: 0
RATE_QUALITY_OF_LIFE: EXCELLENT
RATE_SOCIAL_SATISFACTION: EXCELLENT
CARRYOUT_PHYSICAL_ACTIVITIES: COMPLETELY
RATE_GENERAL_HEALTH: EXCELLENT
RATE_PHYSICAL_HEALTH: EXCELLENT
EMOTIONAL_PROBLEMS: NEVER

## 2025-07-28 ASSESSMENT — COLUMBIA-SUICIDE SEVERITY RATING SCALE - C-SSRS: 1. IN THE PAST MONTH, HAVE YOU WISHED YOU WERE DEAD OR WISHED YOU COULD GO TO SLEEP AND NOT WAKE UP?: NO

## 2025-07-28 ASSESSMENT — PAIN SCALES - GENERAL: PAINLEVEL_OUTOF10: 0-NO PAIN

## 2025-07-28 NOTE — ASSESSMENT & PLAN NOTE
OARRS: Yousuf Donovan DO on 7/28/2025  2:13 PM  I have personally reviewed the OARRS report for Jad Buchanan. I have considered the risks of abuse, dependence, addiction and diversion    Stimulants:   What is the patient's goal of therapy? Maintain normal function  Is this being achieved with current treatment? yes    Activities of Daily Living:   Is your overall impression that this patient is benefiting (symptom reduction outweighs side effects) from stimulant therapy? Yes     1. Physical Functioning: Same  2. Family Relationship: Same  3. Social Relationship: Same  4. Mood: Same  5. Sleep Patterns: Same  6. Overall Function: Better

## 2025-07-28 NOTE — PROGRESS NOTES
Jad Buchanan is a 29 y.o. male who is presenting for Physical    Here today for annual exam and ADHD med management.  Feeling well overall.  Not noticing side effects from medication.     Last  Visit: 2/20247  Reported Health: Good    Dental, Vision, Hearing:  Regular dental visits: yes  - Brushes teeth 2 times per day  - Flosses? yes  Vision problems: no  - Wears glasses or contacts? no  Hearing loss: no    Immunization status:  Up to date: yes    Lifestyle:  Healthy diet: yes  Regular exercise: yes  Alcohol: no  Tobacco: no  Drugs: no    Reproductive Health:  Sexually active: yes - one female partner  Contraception: condoms and female patch  Erectile dysfunction: no    Colorectal Cancer Screening:  Average risk - start age 45  Prostate Cancer Screening:  Average risk, start age 50  Metabolic Screening:  Lipid profile:   Glucose screen:     Review of Systems   Constitutional:  Negative for chills, fatigue and fever.   HENT:  Negative for congestion, hearing loss, rhinorrhea, sinus pressure, sinus pain and tinnitus.    Eyes:  Negative for visual disturbance.   Respiratory:  Negative for cough, shortness of breath and wheezing.    Cardiovascular:  Negative for chest pain, palpitations and leg swelling.   Gastrointestinal:  Negative for constipation, diarrhea, nausea and vomiting.   Endocrine: Negative for cold intolerance, heat intolerance, polydipsia and polyuria.   Genitourinary:  Negative for dysuria, frequency and urgency.   Musculoskeletal:  Negative for arthralgias and myalgias.   Skin:  Negative for pallor, rash and wound.   Neurological:  Negative for dizziness, light-headedness and headaches.   Psychiatric/Behavioral:  Negative for dysphoric mood and sleep disturbance. The patient is not nervous/anxious.        Previous History  Medical History[1]  Surgical History[2]  Social History[3]  Family History[4]  Allergies[5]  No current outpatient medications      Visit Vitals  /56   Pulse 80   Ht  "1.727 m (5' 8\")   Wt 77.6 kg (171 lb)   SpO2 99%   BMI 26.00 kg/m²   Smoking Status Never   BSA 1.93 m²       Physical Exam  Constitutional:       Appearance: Normal appearance. He is normal weight.   HENT:      Head: Normocephalic and atraumatic.      Right Ear: Tympanic membrane, ear canal and external ear normal.      Left Ear: Tympanic membrane, ear canal and external ear normal.      Nose: Nose normal.      Mouth/Throat:      Mouth: Mucous membranes are moist.      Pharynx: Oropharynx is clear.     Eyes:      Extraocular Movements: Extraocular movements intact.      Conjunctiva/sclera: Conjunctivae normal.      Pupils: Pupils are equal, round, and reactive to light.       Cardiovascular:      Rate and Rhythm: Normal rate and regular rhythm.      Pulses: Normal pulses.      Heart sounds: Normal heart sounds.   Pulmonary:      Effort: Pulmonary effort is normal.      Breath sounds: Normal breath sounds.   Abdominal:      General: There is no distension.      Palpations: Abdomen is soft.      Tenderness: There is no abdominal tenderness.     Musculoskeletal:         General: Normal range of motion.     Skin:     General: Skin is warm and dry.      Comments: Small well-circumscribed ovoid mass subcutaneous around posterior aspect of left 8th rib.     Neurological:      Mental Status: He is alert and oriented to person, place, and time. Mental status is at baseline.     Psychiatric:         Mood and Affect: Mood normal.         Behavior: Behavior normal.         Assessment & Plan  Attention deficit hyperactivity disorder, predominantly inattentive type  OARRS: Yousuf Donovan DO on 7/28/2025  2:13 PM  I have personally reviewed the OARRS report for Jad Buchanan. I have considered the risks of abuse, dependence, addiction and diversion    Stimulants:   What is the patient's goal of therapy? Maintain normal function  Is this being achieved with current treatment? yes    Activities of Daily Living:   Is your overall " impression that this patient is benefiting (symptom reduction outweighs side effects) from stimulant therapy? Yes     1. Physical Functioning: Same  2. Family Relationship: Same  3. Social Relationship: Same  4. Mood: Same  5. Sleep Patterns: Same  6. Overall Function: Better           Routine general medical examination at a health care facility  Immunizations reviewed and updated as appropriate.   Cancer screenings reviewed and updated as appropriate.  Diet and physical activity recommendations reviewed.  Patient is meeting recommendations.   Substance history reviewed, no red flags identified.   Sexual practices reviewed, no red flags identified.            Reviewed and approved by HIGINIO PEDERSEN on 7/28/25 at 9:04 PM.        [1]   Past Medical History:  Diagnosis Date    ADHD (attention deficit hyperactivity disorder)    [2] No past surgical history on file.  [3]   Social History  Tobacco Use    Smoking status: Never    Smokeless tobacco: Never   Vaping Use    Vaping status: Never Used   Substance Use Topics    Alcohol use: Never    Drug use: Never   [4]   Family History  Problem Relation Name Age of Onset    Hypertension Father      Hypertension Maternal Grandfather      Hyperlipidemia Maternal Grandfather      Diabetes Maternal Grandfather      Hyperlipidemia Paternal Grandmother      Hypertension Paternal Grandmother     [5] No Known Allergies

## 2025-07-29 LAB
ALBUMIN SERPL-MCNC: 4.6 G/DL (ref 3.6–5.1)
ALP SERPL-CCNC: 56 U/L (ref 36–130)
ALT SERPL-CCNC: 29 U/L (ref 9–46)
ANION GAP SERPL CALCULATED.4IONS-SCNC: 9 MMOL/L (CALC) (ref 7–17)
AST SERPL-CCNC: 29 U/L (ref 10–40)
BILIRUB SERPL-MCNC: 1 MG/DL (ref 0.2–1.2)
BUN SERPL-MCNC: 24 MG/DL (ref 7–25)
CALCIUM SERPL-MCNC: 9.6 MG/DL (ref 8.6–10.3)
CHLORIDE SERPL-SCNC: 103 MMOL/L (ref 98–110)
CHOLEST SERPL-MCNC: 159 MG/DL
CHOLEST/HDLC SERPL: 3.2 (CALC)
CO2 SERPL-SCNC: 27 MMOL/L (ref 20–32)
CREAT SERPL-MCNC: 1.47 MG/DL (ref 0.6–1.24)
EGFRCR SERPLBLD CKD-EPI 2021: 66 ML/MIN/1.73M2
GLUCOSE SERPL-MCNC: 96 MG/DL (ref 65–99)
HDLC SERPL-MCNC: 50 MG/DL
LDLC SERPL CALC-MCNC: 96 MG/DL (CALC)
NONHDLC SERPL-MCNC: 109 MG/DL (CALC)
POTASSIUM SERPL-SCNC: 4.6 MMOL/L (ref 3.5–5.3)
PROT SERPL-MCNC: 7.2 G/DL (ref 6.1–8.1)
SODIUM SERPL-SCNC: 139 MMOL/L (ref 135–146)
TRIGL SERPL-MCNC: 51 MG/DL

## 2025-08-12 PROCEDURE — RXMED WILLOW AMBULATORY MEDICATION CHARGE

## 2025-08-13 ENCOUNTER — PHARMACY VISIT (OUTPATIENT)
Dept: PHARMACY | Facility: CLINIC | Age: 30
End: 2025-08-13
Payer: COMMERCIAL

## 2025-08-14 ENCOUNTER — RESULTS FOLLOW-UP (OUTPATIENT)
Dept: PRIMARY CARE | Facility: CLINIC | Age: 30
End: 2025-08-14
Payer: COMMERCIAL

## 2025-08-14 DIAGNOSIS — R94.4 DECREASED GFR: Primary | ICD-10-CM

## 2026-01-26 ENCOUNTER — APPOINTMENT (OUTPATIENT)
Dept: PRIMARY CARE | Facility: CLINIC | Age: 31
End: 2026-01-26
Payer: COMMERCIAL